# Patient Record
Sex: MALE | Race: WHITE | Employment: OTHER | RURAL
[De-identification: names, ages, dates, MRNs, and addresses within clinical notes are randomized per-mention and may not be internally consistent; named-entity substitution may affect disease eponyms.]

---

## 2020-10-23 PROBLEM — E11.40 TYPE 2 DIABETES MELLITUS WITH DIABETIC NEUROPATHY (HCC): Status: ACTIVE | Noted: 2020-10-23

## 2021-02-15 ENCOUNTER — VIRTUAL VISIT (OUTPATIENT)
Dept: FAMILY MEDICINE CLINIC | Age: 76
End: 2021-02-15

## 2021-02-15 NOTE — PROGRESS NOTES
HISTORY OF PRESENT ILLNESS Obi Escoto is a 70 y.o. male. HPI Pt presents as a new patient with \"medication refills\" Visit was conducted via telephone Pt was located at home, provider was located at [facility] Visit lasted Obi Escoto, who was evaluated through a synchronous (real-time) {virtual platform audio-video vs audio only:08765::\"audio-video\"} encounter, and/or his healthcare decision maker, is aware that it is a billable service, with coverage as determined by his insurance carrier. He provided verbal consent to proceed: {YES/NO/NA-Consent obtained within past 12 months:29497::\"Yes\"}, and patient identification was verified. It was conducted pursuant to the emergency declaration under the [address] [place] [address] authority and the [employer] and [employer] Act. A caregiver was present when appropriate. Ability to conduct physical exam was limited. I was {location home office other:48065::\"at home\"}. The patient was {location home office other:30763::\"at home\"}. Cardiology: Dr Roxy Luque ROS Physical Exam 
 
ASSESSMENT and PLAN 
{ASSESSMENT/PLAN:38939}